# Patient Record
Sex: FEMALE | Race: WHITE | Employment: OTHER
[De-identification: names, ages, dates, MRNs, and addresses within clinical notes are randomized per-mention and may not be internally consistent; named-entity substitution may affect disease eponyms.]

---

## 2024-01-21 SDOH — HEALTH STABILITY: PHYSICAL HEALTH: ON AVERAGE, HOW MANY MINUTES DO YOU ENGAGE IN EXERCISE AT THIS LEVEL?: 10 MIN

## 2024-01-21 SDOH — HEALTH STABILITY: PHYSICAL HEALTH: ON AVERAGE, HOW MANY DAYS PER WEEK DO YOU ENGAGE IN MODERATE TO STRENUOUS EXERCISE (LIKE A BRISK WALK)?: 2 DAYS

## 2024-01-24 ENCOUNTER — OFFICE VISIT (OUTPATIENT)
Dept: FAMILY MEDICINE CLINIC | Facility: CLINIC | Age: 69
End: 2024-01-24
Payer: MEDICARE

## 2024-01-24 VITALS
SYSTOLIC BLOOD PRESSURE: 124 MMHG | TEMPERATURE: 98.1 F | HEART RATE: 65 BPM | HEIGHT: 67 IN | OXYGEN SATURATION: 98 % | BODY MASS INDEX: 31.04 KG/M2 | WEIGHT: 197.8 LBS | DIASTOLIC BLOOD PRESSURE: 86 MMHG

## 2024-01-24 DIAGNOSIS — E78.2 COMBINED HYPERLIPIDEMIA: ICD-10-CM

## 2024-01-24 DIAGNOSIS — G47.33 OBSTRUCTIVE SLEEP APNEA: ICD-10-CM

## 2024-01-24 DIAGNOSIS — M16.11 PRIMARY OSTEOARTHRITIS OF RIGHT HIP: ICD-10-CM

## 2024-01-24 DIAGNOSIS — F41.9 ANXIETY AND DEPRESSION: ICD-10-CM

## 2024-01-24 DIAGNOSIS — M17.0 OSTEOARTHRITIS OF BOTH KNEES, UNSPECIFIED OSTEOARTHRITIS TYPE: ICD-10-CM

## 2024-01-24 DIAGNOSIS — Z85.820 HISTORY OF MELANOMA: ICD-10-CM

## 2024-01-24 DIAGNOSIS — R73.03 PREDIABETES: ICD-10-CM

## 2024-01-24 DIAGNOSIS — Z79.899 MEDICATION MANAGEMENT: ICD-10-CM

## 2024-01-24 DIAGNOSIS — R63.5 WEIGHT GAIN: ICD-10-CM

## 2024-01-24 DIAGNOSIS — F32.A ANXIETY AND DEPRESSION: ICD-10-CM

## 2024-01-24 DIAGNOSIS — G89.29 CHRONIC PAIN OF BOTH KNEES: ICD-10-CM

## 2024-01-24 DIAGNOSIS — Z00.00 MEDICARE ANNUAL WELLNESS VISIT, SUBSEQUENT: ICD-10-CM

## 2024-01-24 DIAGNOSIS — Z12.83 SKIN CANCER SCREENING: ICD-10-CM

## 2024-01-24 DIAGNOSIS — M71.20 SYNOVIAL CYST OF POPLITEAL SPACE, UNSPECIFIED LATERALITY: ICD-10-CM

## 2024-01-24 DIAGNOSIS — Z12.31 BREAST CANCER SCREENING BY MAMMOGRAM: ICD-10-CM

## 2024-01-24 DIAGNOSIS — M25.561 CHRONIC PAIN OF BOTH KNEES: ICD-10-CM

## 2024-01-24 DIAGNOSIS — F90.9 ATTENTION DEFICIT HYPERACTIVITY DISORDER (ADHD), UNSPECIFIED ADHD TYPE: ICD-10-CM

## 2024-01-24 DIAGNOSIS — M25.562 CHRONIC PAIN OF BOTH KNEES: ICD-10-CM

## 2024-01-24 DIAGNOSIS — M70.61 TROCHANTERIC BURSITIS, RIGHT HIP: ICD-10-CM

## 2024-01-24 PROBLEM — A77.0 ROCKY MOUNTAIN SPOTTED FEVER: Status: ACTIVE | Noted: 2024-01-24

## 2024-01-24 PROBLEM — N80.9 ENDOMETRIOSIS: Status: ACTIVE | Noted: 2024-01-24

## 2024-01-24 PROBLEM — N89.8 VAGINAL CYST: Status: RESOLVED | Noted: 2023-01-10 | Resolved: 2024-01-24

## 2024-01-24 PROBLEM — K58.1 IRRITABLE BOWEL SYNDROME WITH CONSTIPATION: Status: ACTIVE | Noted: 2024-01-24

## 2024-01-24 PROBLEM — K22.2 SCHATZKI'S RING: Status: ACTIVE | Noted: 2024-01-24

## 2024-01-24 PROBLEM — N89.8 VAGINAL CYST: Status: ACTIVE | Noted: 2023-01-10

## 2024-01-24 PROBLEM — R21 RASH AND NONSPECIFIC SKIN ERUPTION: Status: ACTIVE | Noted: 2023-07-13

## 2024-01-24 PROBLEM — K57.32 SIGMOID DIVERTICULITIS: Status: ACTIVE | Noted: 2022-09-27

## 2024-01-24 PROBLEM — Z86.39 HISTORY OF HYPERGLYCEMIA: Status: RESOLVED | Noted: 2017-05-26 | Resolved: 2024-01-24

## 2024-01-24 PROBLEM — M85.80 OSTEOPENIA: Status: ACTIVE | Noted: 2022-06-20

## 2024-01-24 PROBLEM — C43.9 MELANOMA OF SKIN (HCC): Status: ACTIVE | Noted: 2024-01-24

## 2024-01-24 PROBLEM — G43.909 MIGRAINES: Status: ACTIVE | Noted: 2024-01-24

## 2024-01-24 PROBLEM — F32.9 MAJOR DEPRESSIVE DISORDER, SINGLE EPISODE, UNSPECIFIED: Status: ACTIVE | Noted: 2024-01-24

## 2024-01-24 PROBLEM — K57.30 DIVERTICULOSIS OF COLON: Status: RESOLVED | Noted: 2024-01-24 | Resolved: 2024-01-24

## 2024-01-24 PROBLEM — M15.9 GENERALIZED OSTEOARTHRITIS: Status: ACTIVE | Noted: 2024-01-24

## 2024-01-24 PROBLEM — K31.7 GASTRIC POLYPS: Status: ACTIVE | Noted: 2023-01-26

## 2024-01-24 PROBLEM — M54.9 CHRONIC MID BACK PAIN: Status: ACTIVE | Noted: 2021-12-01

## 2024-01-24 PROBLEM — K59.04 CHRONIC IDIOPATHIC CONSTIPATION: Status: ACTIVE | Noted: 2023-01-26

## 2024-01-24 PROBLEM — A77.0 ROCKY MOUNTAIN SPOTTED FEVER: Status: RESOLVED | Noted: 2024-01-24 | Resolved: 2024-01-24

## 2024-01-24 PROBLEM — Z86.010 HISTORY OF COLON POLYPS: Status: RESOLVED | Noted: 2023-06-21 | Resolved: 2024-01-24

## 2024-01-24 LAB
ALBUMIN SERPL-MCNC: 4.1 G/DL (ref 3.2–4.6)
ALBUMIN/GLOB SERPL: 1.5 (ref 0.4–1.6)
ALP SERPL-CCNC: 84 U/L (ref 50–136)
ALT SERPL-CCNC: 25 U/L (ref 12–65)
ANION GAP SERPL CALC-SCNC: 2 MMOL/L (ref 2–11)
AST SERPL-CCNC: 16 U/L (ref 15–37)
BASOPHILS # BLD: 0 K/UL (ref 0–0.2)
BASOPHILS NFR BLD: 0 % (ref 0–2)
BILIRUB SERPL-MCNC: 0.4 MG/DL (ref 0.2–1.1)
BUN SERPL-MCNC: 13 MG/DL (ref 8–23)
CALCIUM SERPL-MCNC: 9.5 MG/DL (ref 8.3–10.4)
CHLORIDE SERPL-SCNC: 110 MMOL/L (ref 103–113)
CHOLEST SERPL-MCNC: 233 MG/DL
CO2 SERPL-SCNC: 29 MMOL/L (ref 21–32)
CREAT SERPL-MCNC: 0.6 MG/DL (ref 0.6–1)
DIFFERENTIAL METHOD BLD: NORMAL
EOSINOPHIL # BLD: 0.2 K/UL (ref 0–0.8)
EOSINOPHIL NFR BLD: 3 % (ref 0.5–7.8)
ERYTHROCYTE [DISTWIDTH] IN BLOOD BY AUTOMATED COUNT: 12.1 % (ref 11.9–14.6)
GLOBULIN SER CALC-MCNC: 2.8 G/DL (ref 2.8–4.5)
GLUCOSE SERPL-MCNC: 96 MG/DL (ref 65–100)
HCT VFR BLD AUTO: 44.6 % (ref 35.8–46.3)
HDLC SERPL-MCNC: 71 MG/DL (ref 40–60)
HDLC SERPL: 3.3
HGB BLD-MCNC: 14.6 G/DL (ref 11.7–15.4)
IMM GRANULOCYTES # BLD AUTO: 0 K/UL (ref 0–0.5)
IMM GRANULOCYTES NFR BLD AUTO: 0 % (ref 0–5)
LDLC SERPL CALC-MCNC: 117.4 MG/DL
LYMPHOCYTES # BLD: 2.6 K/UL (ref 0.5–4.6)
LYMPHOCYTES NFR BLD: 39 % (ref 13–44)
MCH RBC QN AUTO: 30.4 PG (ref 26.1–32.9)
MCHC RBC AUTO-ENTMCNC: 32.7 G/DL (ref 31.4–35)
MCV RBC AUTO: 92.7 FL (ref 82–102)
MONOCYTES # BLD: 0.7 K/UL (ref 0.1–1.3)
MONOCYTES NFR BLD: 11 % (ref 4–12)
NEUTS SEG # BLD: 3.2 K/UL (ref 1.7–8.2)
NEUTS SEG NFR BLD: 47 % (ref 43–78)
NRBC # BLD: 0 K/UL (ref 0–0.2)
PLATELET # BLD AUTO: 225 K/UL (ref 150–450)
PMV BLD AUTO: 10.2 FL (ref 9.4–12.3)
POTASSIUM SERPL-SCNC: 4.2 MMOL/L (ref 3.5–5.1)
PROT SERPL-MCNC: 6.9 G/DL (ref 6.3–8.2)
RBC # BLD AUTO: 4.81 M/UL (ref 4.05–5.2)
SODIUM SERPL-SCNC: 141 MMOL/L (ref 136–146)
TRIGL SERPL-MCNC: 223 MG/DL (ref 35–150)
TSH W FREE THYROID IF ABNORMAL: 1.72 UIU/ML (ref 0.36–3.74)
VLDLC SERPL CALC-MCNC: 44.6 MG/DL (ref 6–23)
WBC # BLD AUTO: 6.8 K/UL (ref 4.3–11.1)

## 2024-01-24 PROCEDURE — G0439 PPPS, SUBSEQ VISIT: HCPCS | Performed by: NURSE PRACTITIONER

## 2024-01-24 PROCEDURE — 1123F ACP DISCUSS/DSCN MKR DOCD: CPT | Performed by: NURSE PRACTITIONER

## 2024-01-24 PROCEDURE — 3017F COLORECTAL CA SCREEN DOC REV: CPT | Performed by: NURSE PRACTITIONER

## 2024-01-24 PROCEDURE — G8427 DOCREV CUR MEDS BY ELIG CLIN: HCPCS | Performed by: NURSE PRACTITIONER

## 2024-01-24 PROCEDURE — G8400 PT W/DXA NO RESULTS DOC: HCPCS | Performed by: NURSE PRACTITIONER

## 2024-01-24 PROCEDURE — G8484 FLU IMMUNIZE NO ADMIN: HCPCS | Performed by: NURSE PRACTITIONER

## 2024-01-24 PROCEDURE — 1090F PRES/ABSN URINE INCON ASSESS: CPT | Performed by: NURSE PRACTITIONER

## 2024-01-24 PROCEDURE — 99204 OFFICE O/P NEW MOD 45 MIN: CPT | Performed by: NURSE PRACTITIONER

## 2024-01-24 PROCEDURE — G8417 CALC BMI ABV UP PARAM F/U: HCPCS | Performed by: NURSE PRACTITIONER

## 2024-01-24 PROCEDURE — 1036F TOBACCO NON-USER: CPT | Performed by: NURSE PRACTITIONER

## 2024-01-24 SDOH — HEALTH STABILITY: PHYSICAL HEALTH: ON AVERAGE, HOW MANY DAYS PER WEEK DO YOU ENGAGE IN MODERATE TO STRENUOUS EXERCISE (LIKE A BRISK WALK)?: 2 DAYS

## 2024-01-24 SDOH — ECONOMIC STABILITY: FOOD INSECURITY: WITHIN THE PAST 12 MONTHS, THE FOOD YOU BOUGHT JUST DIDN'T LAST AND YOU DIDN'T HAVE MONEY TO GET MORE.: NEVER TRUE

## 2024-01-24 SDOH — HEALTH STABILITY: PHYSICAL HEALTH: ON AVERAGE, HOW MANY MINUTES DO YOU ENGAGE IN EXERCISE AT THIS LEVEL?: 10 MIN

## 2024-01-24 SDOH — ECONOMIC STABILITY: INCOME INSECURITY: HOW HARD IS IT FOR YOU TO PAY FOR THE VERY BASICS LIKE FOOD, HOUSING, MEDICAL CARE, AND HEATING?: NOT HARD AT ALL

## 2024-01-24 SDOH — ECONOMIC STABILITY: TRANSPORTATION INSECURITY
IN THE PAST 12 MONTHS, HAS LACK OF TRANSPORTATION KEPT YOU FROM MEETINGS, WORK, OR FROM GETTING THINGS NEEDED FOR DAILY LIVING?: NO

## 2024-01-24 SDOH — ECONOMIC STABILITY: FOOD INSECURITY: WITHIN THE PAST 12 MONTHS, YOU WORRIED THAT YOUR FOOD WOULD RUN OUT BEFORE YOU GOT MONEY TO BUY MORE.: NEVER TRUE

## 2024-01-24 ASSESSMENT — LIFESTYLE VARIABLES
HOW OFTEN DO YOU HAVE SIX OR MORE DRINKS ON ONE OCCASION: 1
HOW OFTEN DO YOU HAVE A DRINK CONTAINING ALCOHOL: 4
HOW MANY STANDARD DRINKS CONTAINING ALCOHOL DO YOU HAVE ON A TYPICAL DAY: 1 OR 2
HOW OFTEN DO YOU HAVE A DRINK CONTAINING ALCOHOL: 2-3 TIMES A WEEK
HOW MANY STANDARD DRINKS CONTAINING ALCOHOL DO YOU HAVE ON A TYPICAL DAY: 1

## 2024-01-24 ASSESSMENT — PATIENT HEALTH QUESTIONNAIRE - PHQ9
SUM OF ALL RESPONSES TO PHQ QUESTIONS 1-9: 1
2. FEELING DOWN, DEPRESSED OR HOPELESS: 1
SUM OF ALL RESPONSES TO PHQ9 QUESTIONS 1 & 2: 1
SUM OF ALL RESPONSES TO PHQ QUESTIONS 1-9: 1
SUM OF ALL RESPONSES TO PHQ QUESTIONS 1-9: 1
1. LITTLE INTEREST OR PLEASURE IN DOING THINGS: 0
SUM OF ALL RESPONSES TO PHQ QUESTIONS 1-9: 1

## 2024-01-24 ASSESSMENT — ENCOUNTER SYMPTOMS
ABDOMINAL PAIN: 0
COUGH: 0
CONSTIPATION: 1
APNEA: 1
SHORTNESS OF BREATH: 0
WHEEZING: 0

## 2024-01-24 NOTE — PROGRESS NOTES
Evie Cali is a 68 y.o. female who presents today for the following:  Chief Complaint   Patient presents with    Medicare AWV    New Patient     Changing providers d/t insurance        Allergies   Allergen Reactions    Codeine Headaches    Contrast [Barium-Containing Compounds]      palpitations       No current outpatient medications on file.     No current facility-administered medications for this visit.       Past Medical History:   Diagnosis Date    ADHD (attention deficit hyperactivity disorder) 1997    Anxiety 1997    Cancer (HCC) 1995    Melanoma    Chronic back pain 2019    Depression 1998    GERD (gastroesophageal reflux disease) 1990    Headache 1997    Migraines    History of colon polyps 2023    Last Assessment & Plan:    Formatting of this note might be different from the original.   Colonoscopy in 2023 with large sessile polyp, plan for repeat in one year.    History of depression 2012    History of diverticulosis 2016    History of hyperglycemia 2017    History of kidney stones 2012    Irritable bowel syndrome 24    Obesity     Osteoarthritis 2019    [place] spotted fever 2024    Vaginal cyst 01/10/2023    Last Assessment & Plan:    Formatting of this note might be different from the original.   Patient will set up an appointment for an in office evaluation in a few weeks.       Past Surgical History:   Procedure Laterality Date    HYSTERECTOMY, TOTAL ABDOMINAL (CERVIX REMOVED)  1994    UPPER GASTROINTESTINAL ENDOSCOPY  2023       Social History     Tobacco Use    Smoking status: Former     Current packs/day: 0.00     Average packs/day: 1 pack/day for 18.7 years (18.7 ttl pk-yrs)     Types: Cigarettes     Start date: 1970     Quit date: 10/1/1988     Years since quittin.3     Passive exposure: Never    Smokeless tobacco: Never    Tobacco comments:     I started smoking

## 2024-01-24 NOTE — PROGRESS NOTES
Evie Cali is a 68 y.o. female who presents today for the following:  No chief complaint on file.      Not on File    No current outpatient medications on file.     No current facility-administered medications for this visit.       No past medical history on file.    No past surgical history on file.    Social History     Tobacco Use    Smoking status: Not on file    Smokeless tobacco: Not on file   Substance Use Topics    Alcohol use: Not on file        No family history on file.    HPI     Review of Systems     There were no vitals taken for this visit.    Physical Exam     {There are no diagnoses linked to this encounter. (Refresh or delete this SmartLink)}     Patient informed, we will call with blood work results within one week.  If you have not heard regarding results in over a week, please contact office.  You can also review results on zkipstert.           JESSICA Ruiz - CNP

## 2024-01-24 NOTE — PATIENT INSTRUCTIONS
proteins. Heart-healthy proteins include seafood, lean meats and poultry, eggs, beans, peas, nuts, seeds, and soy products.     Limit drinks and foods with added sugar. These include candy, desserts, and soda pop.   Lifestyle changes    If your doctor recommends it, get more exercise. Walking is a good choice. Bit by bit, increase the amount you walk every day. Try for at least 30 minutes on most days of the week. You also may want to swim, bike, or do other activities.     Do not smoke. If you need help quitting, talk to your doctor about stop-smoking programs and medicines. These can increase your chances of quitting for good. Quitting smoking may be the most important step you can take to protect your heart. It is never too late to quit.     Limit alcohol to 2 drinks a day for men and 1 drink a day for women. Too much alcohol can cause health problems.     Manage other health problems such as diabetes, high blood pressure, and high cholesterol. If you think you may have a problem with alcohol or drug use, talk to your doctor.   Medicines    Take your medicines exactly as prescribed. Call your doctor if you think you are having a problem with your medicine.     If your doctor recommends aspirin, take the amount directed each day. Make sure you take aspirin and not another kind of pain reliever, such as acetaminophen (Tylenol).   When should you call for help?   Call 911 if you have symptoms of a heart attack. These may include:    Chest pain or pressure, or a strange feeling in the chest.     Sweating.     Shortness of breath.     Pain, pressure, or a strange feeling in the back, neck, jaw, or upper belly or in one or both shoulders or arms.     Lightheadedness or sudden weakness.     A fast or irregular heartbeat.   After you call 911, the  may tell you to chew 1 adult-strength or 2 to 4 low-dose aspirin. Wait for an ambulance. Do not try to drive yourself.  Watch closely for changes in your health, and

## 2024-01-24 NOTE — PROGRESS NOTES
Medicare Annual Wellness Visit    Evie Cali is here for Medicare AWV and New Patient (Changing providers d/t insurance )    Assessment & Plan   Medicare annual wellness visit, subsequent  Recommendations for Preventive Services Due: see orders and patient instructions/AVS.  Recommended screening schedule for the next 5-10 years is provided to the patient in written form: see Patient Instructions/AVS.     Return in 1 year (on 1/24/2025) for Medicare Wellness.     Subjective   AWV and establish care.    Patient's complete Health Risk Assessment and screening values have been reviewed and are found in Flowsheets. The following problems were reviewed today and where indicated follow up appointments were made and/or referrals ordered.    Positive Risk Factor Screenings with Interventions:                Activity, Diet, and Weight:  On average, how many days per week do you engage in moderate to strenuous exercise (like a brisk walk)?: 2 days  On average, how many minutes do you engage in exercise at this level?: 10 min    Do you eat balanced/healthy meals regularly?: Yes    Body mass index is 31.45 kg/m². (!) Abnormal    Obesity Interventions:  See AVS for additional education material              Vision Screen:  Do you have difficulty driving, watching TV, or doing any of your daily activities because of your eyesight?: No  Have you had an eye exam within the past year?: (!) No  No results found.    Interventions:   Patient encouraged to make appointment with their eye specialist          Colonoscopy completed 2023.  Discussed alcohol in moderation encouraged decreasing to 1 drink daily or no more than 7 drinks a week.          Objective   Vitals:    01/24/24 1300   BP: 124/86   Pulse: 65   Temp: 98.1 °F (36.7 °C)   TempSrc: Oral   SpO2: 98%   Weight: 89.7 kg (197 lb 12.8 oz)   Height: 1.689 m (5' 6.5\")      Body mass index is 31.45 kg/m².               Allergies   Allergen Reactions    Codeine Headaches

## 2024-01-25 LAB
EST. AVERAGE GLUCOSE BLD GHB EST-MCNC: 111 MG/DL
HBA1C MFR BLD: 5.5 % (ref 4.8–5.6)

## 2024-01-29 NOTE — RESULT ENCOUNTER NOTE
Patient's blood work kidney, electrolytes, thyroid, and complete blood count all look good.  Her total cholesterol is elevated at 233, triglycerides 223, and LDL or bad cholesterol 117.4.  Avoid fried, fast, or processed foods and foods high in saturated fat/cholesterol.  Consume dietary fiber 20-30 grams daily.  Exercise 150 minutes/week.  With her ongoing history of elevated cholesterol, is she interested in starting a statin cholesterol-lowering medication?  Please let me know if patient has any further questions or concerns.

## 2024-01-30 ENCOUNTER — TELEPHONE (OUTPATIENT)
Dept: FAMILY MEDICINE CLINIC | Facility: CLINIC | Age: 69
End: 2024-01-30

## 2024-01-30 NOTE — TELEPHONE ENCOUNTER
----- Message from Amos Mahajan sent at 1/30/2024  1:22 PM EST -----  Subject: Message to Provider    QUESTIONS  Information for Provider? Calling to let doctor know patient declined   referral and did not want to schedule with [facility]  ---------------------------------------------------------------------------  --------------  CALL BACK INFO  [phone]; OK to leave message on voicemail  ---------------------------------------------------------------------------  --------------  SCRIPT ANSWERS  Relationship to Patient? Covered Entity  Covered Entity Type? Other  Other Covered Entity Type? Healthcare Provider  Representative Name? Genia with [facility]

## 2024-03-30 ENCOUNTER — HOSPITAL ENCOUNTER (OUTPATIENT)
Dept: MAMMOGRAPHY | Age: 69
End: 2024-03-30
Payer: MEDICARE

## 2024-03-30 DIAGNOSIS — Z12.31 BREAST CANCER SCREENING BY MAMMOGRAM: ICD-10-CM

## 2024-03-30 PROCEDURE — 77067 SCR MAMMO BI INCL CAD: CPT
